# Patient Record
Sex: MALE | Race: WHITE | Employment: FULL TIME | URBAN - METROPOLITAN AREA
[De-identification: names, ages, dates, MRNs, and addresses within clinical notes are randomized per-mention and may not be internally consistent; named-entity substitution may affect disease eponyms.]

---

## 2018-09-27 ENCOUNTER — OFFICE VISIT (OUTPATIENT)
Dept: FAMILY MEDICINE CLINIC | Age: 38
End: 2018-09-27
Payer: COMMERCIAL

## 2018-09-27 VITALS
HEART RATE: 90 BPM | BODY MASS INDEX: 29.4 KG/M2 | WEIGHT: 210 LBS | OXYGEN SATURATION: 96 % | DIASTOLIC BLOOD PRESSURE: 85 MMHG | SYSTOLIC BLOOD PRESSURE: 131 MMHG | HEIGHT: 71 IN

## 2018-09-27 DIAGNOSIS — F41.9 ANXIETY: ICD-10-CM

## 2018-09-27 DIAGNOSIS — R06.83 SNORING: ICD-10-CM

## 2018-09-27 DIAGNOSIS — Z13.1 DIABETES MELLITUS SCREENING: ICD-10-CM

## 2018-09-27 DIAGNOSIS — Z00.00 HEALTH CARE MAINTENANCE: Primary | ICD-10-CM

## 2018-09-27 DIAGNOSIS — E66.3 OVER WEIGHT: ICD-10-CM

## 2018-09-27 PROCEDURE — 99385 PREV VISIT NEW AGE 18-39: CPT | Performed by: FAMILY MEDICINE

## 2018-09-27 ASSESSMENT — PATIENT HEALTH QUESTIONNAIRE - PHQ9
SUM OF ALL RESPONSES TO PHQ9 QUESTIONS 1 & 2: 0
SUM OF ALL RESPONSES TO PHQ QUESTIONS 1-9: 0
1. LITTLE INTEREST OR PLEASURE IN DOING THINGS: 0
SUM OF ALL RESPONSES TO PHQ QUESTIONS 1-9: 0
2. FEELING DOWN, DEPRESSED OR HOPELESS: 0

## 2018-09-27 NOTE — PROGRESS NOTES
2018    Samra Noyola (:  1980) is a 41 y.o. male, here for a preventive medicine evaluation. Some trouble falling asleep, has been drinking caffienated drinks late in the day  Diet:drinks pop, fred aid  Exercise: doesn't do as much as he likes to, at job on feet constantly moving,  at [employer]    Patient Active Problem List   Diagnosis    Influenza    Trichomoniasis    Anxiety    Over weight       Prior to Visit Medications    Not on File        No Known Allergies    Past Medical History:   Diagnosis Date    Anxiety        No past surgical history on file. Social History     Social History    Marital status: Single     Spouse name: N/A    Number of children: N/A    Years of education: N/A     Occupational History    Not on file. Social History Main Topics    Smoking status: Former Smoker     Packs/day: 1.00     Years: 8.00     Types: Cigarettes     Quit date: 10/13/2014    Smokeless tobacco: Never Used    Alcohol use No      Comment: rarely    Drug use: No    Sexual activity: Not on file     Other Topics Concern    Not on file     Social History Narrative    No narrative on file        Family History   Problem Relation Age of Onset    Cancer Mother     Cancer Maternal Grandmother     Diabetes Maternal Grandfather     Other Maternal Grandfather        ADVANCE DIRECTIVE: N, Not Received    Vitals:    18 1514 18 1520   BP: (!) 146/89 131/85   Pulse: 97 90   SpO2: 96%    Weight: 210 lb (95.3 kg)    Height: 5' 11\" (1.803 m)      Estimated body mass index is 29.29 kg/m² as calculated from the following:    Height as of this encounter: 5' 11\" (1.803 m). Weight as of this encounter: 210 lb (95.3 kg). Physical Exam   Constitutional: He is oriented to person, place, and time. He appears well-developed and well-nourished. HENT:   Head: Normocephalic and atraumatic. Eyes: Conjunctivae and EOM are normal.   Neck: Normal range of motion.  No

## 2018-11-16 ENCOUNTER — TELEPHONE (OUTPATIENT)
Dept: PULMONOLOGY | Age: 38
End: 2018-11-16

## 2019-04-28 ENCOUNTER — APPOINTMENT (OUTPATIENT)
Dept: GENERAL RADIOLOGY | Age: 39
End: 2019-04-28
Payer: COMMERCIAL

## 2019-04-28 ENCOUNTER — HOSPITAL ENCOUNTER (EMERGENCY)
Age: 39
Discharge: HOME | End: 2019-04-28
Payer: COMMERCIAL

## 2019-04-28 VITALS
BODY MASS INDEX: 29.4 KG/M2 | SYSTOLIC BLOOD PRESSURE: 118 MMHG | TEMPERATURE: 98 F | RESPIRATION RATE: 12 BRPM | DIASTOLIC BLOOD PRESSURE: 84 MMHG | HEIGHT: 71 IN | HEART RATE: 68 BPM | OXYGEN SATURATION: 96 % | WEIGHT: 210 LBS

## 2019-04-28 DIAGNOSIS — R04.2 HEMOPTYSIS: Primary | ICD-10-CM

## 2019-04-28 PROCEDURE — 71046 X-RAY EXAM CHEST 2 VIEWS: CPT

## 2019-04-28 PROCEDURE — 99284 EMERGENCY DEPT VISIT MOD MDM: CPT

## 2019-04-28 RX ORDER — OMEPRAZOLE 20 MG/1
20 CAPSULE, DELAYED RELEASE ORAL
Qty: 180 CAPSULE | Refills: 1 | Status: SHIPPED | OUTPATIENT
Start: 2019-04-28 | End: 2020-08-24

## 2019-04-28 ASSESSMENT — PAIN DESCRIPTION - PAIN TYPE: TYPE: ACUTE PAIN

## 2019-04-28 NOTE — ED PROVIDER NOTES
Emergency 1 [facility] [address] ED    Patient: Randi Kenny  [facility]:[id number]  : 1980  Date of Evaluation: 2019  ED BERTO Provider: YUSRA Cruz    Chief Complaint       Chief Complaint   Patient presents with    Hemoptysis     coughed up bright red blood at 830 today     Anton Wise is a 46 y.o. male with past medical history significant for anxiety now presents to the emergency department for evaluation of hemoptysis which he describes occurring prehospital times one. Patient states that he woke up and shortly after he woke up he had a odd taste in his mouth he stated that he began to cough when he coughed he noticed that he coughed up some blood and this concerned him and prompted his presentation emergency department today. He denies any chest pain any sore throat symptoms any dysphagia or any shortness of breath or any trouble tolerating his secretions he complains only of 6 out of 10 concern over this hemoptysis ×1 independent of any recent fever chills nausea vomiting or other change in his health without other radiation aggravating or relieving factors times one day    ROS:     Review of Systems one-day history of idiopathic hemoptysis ×1  At least 10 systemsreviewed and otherwise acutely negative except as in the [address] Past History     Past Medical History:   Diagnosis Date    Anxiety     Anxiety      History reviewed. No pertinent surgical history.   Social History     Socioeconomic History    Marital status: Single     Spouse name: None    Number of children: None    Years of education: None    Highest education level: None   Occupational History    None   Social Needs    Financial resource strain: None    Food insecurity:     Worry: None     Inability: None    Transportation needs:     Medical: None     Non-medical: None   Tobacco Use    Smoking status: Former Smoker     Packs/day: 1.00     Years: 8.00     Pack years: 8.00 sensorimotor intact ×4 extremities  Integument:  Skin is warm well perfused  Lymphatic:  No significant lymphadenopathy appreciated  Neurologic:  Alert and oriented ×3, cranial nerves II through XII grossly intact as tested, patient able to ambulate, no ataxia, cauda equina, saddle anesthesia or bowel or bladder incontinence  Psychiatric:  Mood, behavior, judgment all within normal limits      ED Course and MDM           In brief, Kaya Worthington is a 46 y.o. male who presented to the emergency department for evaluation of one-day history of idiopathic hemoptysis ×1     On clinical evaluation patient does not demonstrate any obvious sequelae of this there is no ongoing cough no ongoing evidence of blood intraoral cavity is clear patient tolerating his secretions well there's no concern for any active or ongoing process    My sense is that this patient probably noted some coughing following postnasal drip and some secretions from friable and dry mucous membranes of the nasal cavity and posterior oral pharynx and when he coughed and produce this sputum this concerned him due to its color    Other differential considerations could include reflux disease corina the patient will be offered a medication the outpatient setting to manage ongoing reflux should this be a consideration. He is also encouraged to follow up with his PCP for further evaluation treatment and possible referral to GI specialty for endoscopy    ED Medication Orders (From admission, onward)    None          Final Impression      1. Hemoptysis        DISPOSITION Decision To Discharge 04/28/2019 10:06:27 AM     Patient seen independently with an Emergency Medicine attending available for supervision.     (Please note that portions of this note may have been completed with a voice recognition program. Efforts were made to edit the dictations but occasionally words aremis-transcribed.)    Deangelo Tello, [address] [facility] [address] 51, [place]  04/28/19 1018

## 2019-04-28 NOTE — LETTER
[address]  [facility] [address]  Phone: [phone]             April 28, 2019    Patient: Forrest Diego   YOB: 1980   Date of Visit: 4/28/2019       To Whom It May Concern:    Morelia Quintana was seen and treated in our emergency department on 4/28/2019. He may return to Work/School on the following date 4/29/2019.       Sincerely,       Erin Church RN         Signature:__________________________________

## 2019-04-28 NOTE — LETTER
[facility] [place] ED  [facility] [address]  Phone: [phone]             April 28, 2019    Patient: Ara Garcia   YOB: 1980   Date of Visit: 4/28/2019       To Whom It May Concern:    Ju Sherman was seen and treated in our emergency department on 4/28/2019. He may return to Work/School on the following date 4/29/2019.       Sincerely,       Fuentes Sanchez RN         Signature:__________________________________

## 2019-05-10 ENCOUNTER — OFFICE VISIT (OUTPATIENT)
Dept: FAMILY MEDICINE CLINIC | Age: 39
End: 2019-05-10
Payer: COMMERCIAL

## 2019-05-10 VITALS
BODY MASS INDEX: 28.56 KG/M2 | OXYGEN SATURATION: 96 % | HEIGHT: 71 IN | WEIGHT: 204 LBS | RESPIRATION RATE: 16 BRPM | TEMPERATURE: 99.2 F | DIASTOLIC BLOOD PRESSURE: 80 MMHG | SYSTOLIC BLOOD PRESSURE: 124 MMHG | HEART RATE: 117 BPM

## 2019-05-10 DIAGNOSIS — J06.9 VIRAL URI: Primary | ICD-10-CM

## 2019-05-10 DIAGNOSIS — R52 GENERALIZED BODY ACHES: ICD-10-CM

## 2019-05-10 LAB
INFLUENZA A ANTIBODY: NORMAL
INFLUENZA B ANTIBODY: NORMAL

## 2019-05-10 PROCEDURE — 99213 OFFICE O/P EST LOW 20 MIN: CPT | Performed by: NURSE PRACTITIONER

## 2019-05-10 PROCEDURE — 87804 INFLUENZA ASSAY W/OPTIC: CPT | Performed by: NURSE PRACTITIONER

## 2019-05-10 RX ORDER — AMOXICILLIN AND CLAVULANATE POTASSIUM 875; 125 MG/1; MG/1
1 TABLET, FILM COATED ORAL 2 TIMES DAILY
Qty: 14 TABLET | Refills: 0 | Status: SHIPPED | OUTPATIENT
Start: 2019-05-10 | End: 2019-05-17

## 2019-05-10 ASSESSMENT — PATIENT HEALTH QUESTIONNAIRE - PHQ9
SUM OF ALL RESPONSES TO PHQ QUESTIONS 1-9: 0
1. LITTLE INTEREST OR PLEASURE IN DOING THINGS: 0
SUM OF ALL RESPONSES TO PHQ QUESTIONS 1-9: 0
SUM OF ALL RESPONSES TO PHQ9 QUESTIONS 1 & 2: 0
2. FEELING DOWN, DEPRESSED OR HOPELESS: 0

## 2019-05-10 ASSESSMENT — ENCOUNTER SYMPTOMS
COUGH: 1
SORE THROAT: 1
VOMITING: 1
SHORTNESS OF BREATH: 0
RHINORRHEA: 1
SINUS PRESSURE: 1
NAUSEA: 0
SINUS PAIN: 1
VOICE CHANGE: 1
WHEEZING: 0

## 2019-05-10 NOTE — PROGRESS NOTES
5/10/2019     Liz Allen (:  1980) is a 46 y.o. male, here for evaluation of the following medical concerns:    HPI  Patient presents today with complaints of sinus congestion, cough, body aches and fever. Temp here today was 99.2. He has not checked his temp at home. He has tried some sinus medication OTC that has not helped. He is coughing up some yellow mucous. He did have vomiting today. Review of Systems   Constitutional: Positive for chills, diaphoresis, fatigue and fever. HENT: Positive for congestion, rhinorrhea, sinus pressure, sinus pain, sore throat and voice change. Negative for ear discharge and ear pain. Respiratory: Positive for cough. Negative for shortness of breath and wheezing. Cardiovascular: Negative. Gastrointestinal: Positive for vomiting. Negative for nausea. Genitourinary: Negative. Musculoskeletal:        General body aches   Neurological: Negative for dizziness and headaches. Psychiatric/Behavioral: Negative. Prior to Visit Medications    Medication Sig Taking?  Authorizing Provider   amoxicillin-clavulanate (AUGMENTIN) 875-125 MG per tablet Take 1 tablet by mouth 2 times daily for 7 days Yes NADER Cabezas - CNP   omeprazole (PRILOSEC) 20 MG delayed release capsule Take 1 capsule by mouth 2 times daily (before meals) Yes YUSRA Velarde        Social History     Tobacco Use    Smoking status: Former Smoker     Packs/day: 1.00     Years: 8.00     Pack years: 8.00     Types: Cigarettes     Last attempt to quit: 10/13/2014     Years since quittin.5    Smokeless tobacco: Never Used   Substance Use Topics    Alcohol use: Yes     Comment: rarely        Vitals:    05/10/19 1450   BP: 124/80   Pulse: 117   Resp: 16   Temp: 99.2 °F (37.3 °C)   SpO2: 96%   Weight: 204 lb (92.5 kg)   Height: 5' 11\" (1.803 m)     Estimated body mass index is 28.45 kg/m² as calculated from the following:    Height as of this encounter: 5' 11\" (1.803 m). Weight as of this encounter: 204 lb (92.5 kg). Physical Exam   Constitutional: He is oriented to person, place, and time. He appears well-developed and well-nourished. HENT:   Head: Normocephalic. Right Ear: Hearing, tympanic membrane, external ear and ear canal normal.   Left Ear: Hearing, tympanic membrane, external ear and ear canal normal.   Nose: No mucosal edema. Right sinus exhibits frontal sinus tenderness. Right sinus exhibits no maxillary sinus tenderness. Left sinus exhibits frontal sinus tenderness. Left sinus exhibits no maxillary sinus tenderness. Mouth/Throat: Uvula is midline and mucous membranes are normal. Posterior oropharyngeal erythema present. No oropharyngeal exudate or posterior oropharyngeal edema. No tonsillar exudate. Cardiovascular: Normal rate and regular rhythm. Pulmonary/Chest: Effort normal and breath sounds normal.   Musculoskeletal: Normal range of motion. Neurological: He is alert and oriented to person, place, and time. Skin: Skin is warm and dry. Vitals reviewed. ASSESSMENT/PLAN:  1. Viral URI  likely viral. I advised him to take sudafed that is available OTC and if no improvement then he can take Augmentin. I gave him a printed script for Augmentin to fill if no improving    2. Generalized body aches  Rapid flu is negative. - RAPID INFLUENZA A/B ANTIGENS          An electronic signature was used to authenticate this note.     --NADER Granados - CNP on 5/10/2019 at 3:26 PM

## 2020-08-20 ENCOUNTER — HOSPITAL ENCOUNTER (EMERGENCY)
Age: 40
Discharge: HOME | End: 2020-08-20

## 2020-08-20 ENCOUNTER — NURSE TRIAGE (OUTPATIENT)
Dept: OTHER | Facility: CLINIC | Age: 40
End: 2020-08-20

## 2020-08-20 ENCOUNTER — APPOINTMENT (OUTPATIENT)
Dept: GENERAL RADIOLOGY | Age: 40
End: 2020-08-20

## 2020-08-20 VITALS
TEMPERATURE: 98 F | OXYGEN SATURATION: 98 % | DIASTOLIC BLOOD PRESSURE: 87 MMHG | HEART RATE: 93 BPM | HEIGHT: 71 IN | WEIGHT: 205 LBS | BODY MASS INDEX: 28.7 KG/M2 | RESPIRATION RATE: 14 BRPM | SYSTOLIC BLOOD PRESSURE: 131 MMHG

## 2020-08-20 LAB
A/G RATIO: 1.4 (ref 1.1–2.2)
ALBUMIN SERPL-MCNC: 4.6 G/DL (ref 3.4–5)
ALP BLD-CCNC: 93 U/L (ref 40–129)
ALT SERPL-CCNC: 33 U/L (ref 10–40)
ANION GAP SERPL CALCULATED.3IONS-SCNC: 9 MMOL/L (ref 3–16)
AST SERPL-CCNC: 21 U/L (ref 15–37)
BASOPHILS ABSOLUTE: 0.1 K/UL (ref 0–0.2)
BASOPHILS RELATIVE PERCENT: 0.8 %
BILIRUB SERPL-MCNC: 0.6 MG/DL (ref 0–1)
BUN BLDV-MCNC: 17 MG/DL (ref 7–20)
CALCIUM SERPL-MCNC: 9.4 MG/DL (ref 8.3–10.6)
CHLORIDE BLD-SCNC: 102 MMOL/L (ref 99–110)
CO2: 24 MMOL/L (ref 21–32)
CREAT SERPL-MCNC: 1 MG/DL (ref 0.9–1.3)
EOSINOPHILS ABSOLUTE: 0.6 K/UL (ref 0–0.6)
EOSINOPHILS RELATIVE PERCENT: 5.1 %
GFR AFRICAN AMERICAN: >60
GFR NON-AFRICAN AMERICAN: >60
GLOBULIN: 3.4 G/DL
GLUCOSE BLD-MCNC: 124 MG/DL (ref 70–99)
HCT VFR BLD CALC: 49.1 % (ref 40.5–52.5)
HEMOGLOBIN: 16.8 G/DL (ref 13.5–17.5)
LYMPHOCYTES ABSOLUTE: 1.6 K/UL (ref 1–5.1)
LYMPHOCYTES RELATIVE PERCENT: 12.7 %
MCH RBC QN AUTO: 33.7 PG (ref 26–34)
MCHC RBC AUTO-ENTMCNC: 34.2 G/DL (ref 31–36)
MCV RBC AUTO: 98.7 FL (ref 80–100)
MONOCYTES ABSOLUTE: 0.6 K/UL (ref 0–1.3)
MONOCYTES RELATIVE PERCENT: 4.9 %
NEUTROPHILS ABSOLUTE: 9.4 K/UL (ref 1.7–7.7)
NEUTROPHILS RELATIVE PERCENT: 76.5 %
PDW BLD-RTO: 12.7 % (ref 12.4–15.4)
PLATELET # BLD: 270 K/UL (ref 135–450)
PLATELET SLIDE REVIEW: ADEQUATE
PMV BLD AUTO: 7.6 FL (ref 5–10.5)
POTASSIUM REFLEX MAGNESIUM: 4.2 MMOL/L (ref 3.5–5.1)
RBC # BLD: 4.97 M/UL (ref 4.2–5.9)
SLIDE REVIEW: ABNORMAL
SODIUM BLD-SCNC: 135 MMOL/L (ref 136–145)
TOTAL PROTEIN: 8 G/DL (ref 6.4–8.2)
TROPONIN: <0.01 NG/ML
WBC # BLD: 12.2 K/UL (ref 4–11)

## 2020-08-20 PROCEDURE — 6370000000 HC RX 637 (ALT 250 FOR IP): Performed by: PHYSICIAN ASSISTANT

## 2020-08-20 PROCEDURE — 6360000002 HC RX W HCPCS: Performed by: PHYSICIAN ASSISTANT

## 2020-08-20 PROCEDURE — 71046 X-RAY EXAM CHEST 2 VIEWS: CPT

## 2020-08-20 PROCEDURE — 84484 ASSAY OF TROPONIN QUANT: CPT

## 2020-08-20 PROCEDURE — 73030 X-RAY EXAM OF SHOULDER: CPT

## 2020-08-20 PROCEDURE — 80053 COMPREHEN METABOLIC PANEL: CPT

## 2020-08-20 PROCEDURE — 85025 COMPLETE CBC W/AUTO DIFF WBC: CPT

## 2020-08-20 PROCEDURE — 93005 ELECTROCARDIOGRAM TRACING: CPT | Performed by: PHYSICIAN ASSISTANT

## 2020-08-20 PROCEDURE — 99284 EMERGENCY DEPT VISIT MOD MDM: CPT

## 2020-08-20 RX ORDER — DEXAMETHASONE 4 MG/1
6 TABLET ORAL ONCE
Status: COMPLETED | OUTPATIENT
Start: 2020-08-20 | End: 2020-08-20

## 2020-08-20 RX ORDER — NAPROXEN 500 MG/1
500 TABLET ORAL 2 TIMES DAILY
Qty: 20 TABLET | Refills: 0 | Status: SHIPPED | OUTPATIENT
Start: 2020-08-20 | End: 2021-01-25

## 2020-08-20 RX ORDER — NAPROXEN 500 MG/1
500 TABLET ORAL ONCE
Status: COMPLETED | OUTPATIENT
Start: 2020-08-20 | End: 2020-08-20

## 2020-08-20 RX ORDER — METHYLPREDNISOLONE 4 MG/1
TABLET ORAL
Qty: 1 KIT | Refills: 0 | Status: SHIPPED | OUTPATIENT
Start: 2020-08-20 | End: 2020-08-24

## 2020-08-20 RX ORDER — ASPIRIN 325 MG
325 TABLET ORAL ONCE
Status: COMPLETED | OUTPATIENT
Start: 2020-08-20 | End: 2020-08-20

## 2020-08-20 RX ADMIN — NAPROXEN 500 MG: 500 TABLET ORAL at 19:46

## 2020-08-20 RX ADMIN — DEXAMETHASONE 6 MG: 4 TABLET ORAL at 19:46

## 2020-08-20 RX ADMIN — ASPIRIN 325 MG: 325 TABLET, FILM COATED ORAL at 18:40

## 2020-08-20 ASSESSMENT — ENCOUNTER SYMPTOMS
SINUS PRESSURE: 0
SINUS PAIN: 0
EYE REDNESS: 0
RHINORRHEA: 0
SORE THROAT: 0
DIARRHEA: 0
CONSTIPATION: 0
ABDOMINAL PAIN: 0
NAUSEA: 0
COUGH: 0
EYE DISCHARGE: 0
SHORTNESS OF BREATH: 0
VOMITING: 0
CHEST TIGHTNESS: 0

## 2020-08-20 ASSESSMENT — PAIN SCALES - GENERAL
PAINLEVEL_OUTOF10: 3
PAINLEVEL_OUTOF10: 3

## 2020-08-20 ASSESSMENT — PAIN DESCRIPTION - FREQUENCY: FREQUENCY: CONTINUOUS

## 2020-08-20 ASSESSMENT — PAIN DESCRIPTION - DESCRIPTORS: DESCRIPTORS: CONSTANT

## 2020-08-20 ASSESSMENT — PAIN DESCRIPTION - LOCATION: LOCATION: SHOULDER;RIB CAGE

## 2020-08-20 ASSESSMENT — HEART SCORE: ECG: 0

## 2020-08-20 ASSESSMENT — PAIN DESCRIPTION - PAIN TYPE: TYPE: ACUTE PAIN

## 2020-08-20 ASSESSMENT — PAIN DESCRIPTION - ORIENTATION: ORIENTATION: RIGHT

## 2020-08-20 NOTE — ED NOTES
Pt notified employer requires drug screen. Outside agency will be here within the hour for urine sample. Brennan Duncan RN  08/20/20 [id number]

## 2020-08-20 NOTE — ED PROVIDER NOTES
I did not perform a face-to-face evaluation of the patient. I did interpret the patient's EKG as noted below: The Ekg interpreted by me shows  normal sinus rhythm with a rate of 86  Axis is   Normal  QTc is  normal  Intervals and Durations are unremarkable.       ST Segments: no acute change  No significant change from prior EKG dated 12/14/15            Cherry Barboza MD  08/20/20 2023

## 2020-08-20 NOTE — TELEPHONE ENCOUNTER
Reason for Disposition   SEVERE chest or rib pain (e.g., excruciating, unable to do any normal activities)    Answer Assessment - Initial Assessment Questions  1. MECHANISM: \"How did the injury happen? \"      Lifted and twisted while stocking items  2. ONSET: \"When did the injury happen? \" (e.g., minutes, hours, days ago)      2 days ago  3. LOCATION: \"Where on the chest is the injury located? \" \"Where does it hurt? \"      Right side and arm  4. CHEST OR RIB PAIN SEVERITY: \"How bad is the pain? \"  (e.g., Scale 1-10; mild, moderate, or severe)     - MILD (1-3): doesn't interfere with normal activities      - MODERATE (4-7): interferes with normal activities or awakens from sleep     - SEVERE (8-10): excruciating pain, unable to do any normal activities        moderate  5. BREATHING DIFFICULTY: Estle Basset you having any difficulty breathing? \" If so, ask \"How bad is it? \"  (e.g., none, mild, moderate, severe)   normal  6: OTHER SYMPTOMS (e.g., cough, fever, rash)       Cough, rash  7. PREGNANCY: \"Is there any chance you are pregnant? \" \"When was your last menstrual period? \"      n/a    Protocols used: CHEST INJURY - BENDING, LIFTING, OR TWISTING-ADULT-  Caller concerned with moderate pain at rest and sever pain with movement. Caller provided care advice and instructed to call back with worsening symptoms.

## 2020-08-20 NOTE — ED PROVIDER NOTES
[address] ED  EMERGENCY DEPARTMENT ENCOUNTER        Pt Name: Christian Sevilla  MRN: [id number]  [place] 1980  Date of evaluation: 8/20/2020  Provider: Estelle Short PA-C  PCP: Cari Haywood MD  ED Attending: No att. providers found      This patient was not seen and evaluated by the attending physician No att. providers found. I have independently evaluated this patient. CHIEF COMPLAINT       Chief Complaint   Patient presents with    Rib Pain     Right sided rib pain after lifting eggs @ work 8/18/19, painful to cough, also right sided shoulder pain       HISTORY OF PRESENT ILLNESS   (Location/Symptom, Timing/Onset, Context/Setting, Quality, Duration, Modifying Factors, Severity)  Note limiting factors. Christian Sevilla is a 45 y.o. male who presents to the ED today with right sided rib and shoulder pain. Onset was Tuesday 8/18/2020 while at work after lifting a box of eggs (~20lbs). He called his PCP about his symptoms and was advised to come to the ED for further workup. Patient states most of his pain is localized to his pec major muscle but sometimes radiates to his right shoulder. He states pain with deep inspiration and with AROM of right shoulder joint. Denies SOB, chest tightness, or paraesthesias in the distal extremity. Patient applied OTC topical analgesic cream yesterday and says that helped. Patient has no cardiac history. Nursing Notes were all reviewed and agreed with or any disagreements were addressed  in the HPI. REVIEW OF SYSTEMS  (2-9 systems for level 4, 10 or more for level 5)     Review of Systems   Constitutional: Negative for chills and fever. HENT: Negative. Negative for congestion, rhinorrhea, sinus pressure, sinus pain and sore throat. Eyes: Negative for discharge, redness and visual disturbance. Respiratory: Negative for cough, chest tightness and shortness of breath.     Cardiovascular: Negative for chest pain and palpitations. Gastrointestinal: Negative for abdominal pain, constipation, diarrhea, nausea and vomiting. Genitourinary: Negative for difficulty urinating, dysuria and frequency. Musculoskeletal: Positive for arthralgias and myalgias. Right pec major/minor and shoulder. No deformity. Skin: Negative. No erythema, bruising, or ecchymosis    Neurological: Negative. Negative for dizziness, weakness, numbness and headaches. Psychiatric/Behavioral: Negative. All other systems reviewed and are negative. Positivesand Pertinent negatives as per HPI. Except as noted above in the ROS, all other systems were reviewed and negative. PAST MEDICAL HISTORY     Past Medical History:   Diagnosis Date    Anxiety     Anxiety          SURGICAL HISTORY     History reviewed. No pertinent surgical history. CURRENT MEDICATIONS       Discharge Medication List as of 8/20/2020  7:35 PM      CONTINUE these medications which have NOT CHANGED    Details   omeprazole (PRILOSEC) 20 MG delayed release capsule Take 1 capsule by mouth 2 times daily (before meals), Disp-180 capsule, R-1Print               ALLERGIES     Patient has no known allergies.     FAMILY HISTORY       Family History   Problem Relation Age of Onset    Cancer Mother     Cancer Maternal Grandmother     Diabetes Maternal Grandfather     Other Maternal Grandfather          SOCIAL HISTORY       Social History     Socioeconomic History    Marital status: Single     Spouse name: None    Number of children: None    Years of education: None    Highest education level: None   Occupational History    None   Social Needs    Financial resource strain: None    Food insecurity     Worry: None     Inability: None    Transportation needs     Medical: None     Non-medical: None   Tobacco Use    Smoking status: Former Smoker     Packs/day: 1.00     Years: 8.00     Pack years: 8.00     Types: Cigarettes     Last attempt to quit: 10/13/2014 Years since quittin.8    Smokeless tobacco: Never Used   Substance and Sexual Activity    Alcohol use: Yes     Comment: rarely    Drug use: No    Sexual activity: None   Lifestyle    Physical activity     Days per week: None     Minutes per session: None    Stress: None   Relationships    Social connections     Talks on phone: None     Gets together: None     Attends Jehovah's witness service: None     Active member of club or organization: None     Attends meetings of clubs or organizations: None     Relationship status: None    Intimate partner violence     Fear of current or ex partner: None     Emotionally abused: None     Physically abused: None     Forced sexual activity: None   Other Topics Concern    None   Social History Narrative    None       SCREENINGS     NIH Score       [facility]ow Delong Coma Scale  Eye Opening: Spontaneous  Best Verbal Response: Oriented  Best Motor Response: Obeys commands  Kerrie Coma Scale Score: 15    [facility]     Heart Score  Heart Score for chest pain patients  History: Slightly Suspicious  ECG: Normal  Patient Age: < 45 years  *Risk factors for Atherosclerotic disease: Obesity, Cigarette smoking  Risk Factors: 1 or 2 risk factors  Troponin: < 1X normal limit  Heart Score Total: 1      PHYSICAL EXAM    (up to 7 for level 4, 8 ormore for level 5)     ED Triage Vitals [20 1758]   BP Temp Temp Source Pulse Resp SpO2 Height Weight   (!) 130/92 98.7 °F (37.1 °C) Oral 102 16 96 % 5' 11\" (1.803 m) 205 lb (93 kg)       Physical Exam  Vitals signs reviewed. Constitutional:       Appearance: Normal appearance. HENT:      Head: Normocephalic and atraumatic. Right Ear: Tympanic membrane normal.      Left Ear: Tympanic membrane normal.      Nose: Nose normal.      Mouth/Throat:      Mouth: Mucous membranes are moist.   Eyes:      Extraocular Movements: Extraocular movements intact.       Conjunctiva/sclera: Conjunctivae normal.      Pupils: Pupils are equal, round, and reactive to light. Neck:      Musculoskeletal: Neck supple. Cardiovascular:      Rate and Rhythm: Normal rate and regular rhythm. Pulses: Normal pulses. Heart sounds: Normal heart sounds. Pulmonary:      Effort: Pulmonary effort is normal.      Breath sounds: Normal breath sounds. Abdominal:      General: Bowel sounds are normal.   Musculoskeletal:         General: Tenderness present. No swelling or deformity. Comments: AROM/PROM of right shoulder joint due to pain. Tenderness over right pec major. Skin:     General: Skin is warm. Capillary Refill: Capillary refill takes less than 2 seconds. Findings: No bruising. Neurological:      General: No focal deficit present. Mental Status: He is alert. Psychiatric:         Mood and Affect: Mood normal.         DIAGNOSTIC RESULTS   LABS:    Labs Reviewed   CBC WITH AUTO DIFFERENTIAL - Abnormal; Notable for the following components:       Result Value    WBC 12.2 (*)     Neutrophils Absolute 9.4 (*)     All other components within normal limits    Narrative:     Performed at:  [place] [place] [address],  [place], [facility]   Phone [phone]   COMPREHENSIVE METABOLIC PANEL W/ REFLEX TO MG FOR LOW K - Abnormal; Notable for the following components:    Sodium 135 (*)     Glucose 124 (*)     All other components within normal limits    Narrative:     Performed at:  [facility],  [place], [facility]   Phone [phone]   TROPONIN    Narrative:     Performed at:  [facility]) - [place] [address],  [place], [facility]   Phone [phone]       All other labs were within normal range or notreturned as of this dictation. EKG:  All EKG's are interpreted by the Emergency Department Physician who either signs or Co-signs this chart in the absence of a cardiologist.  Please see their note for interpretation of EKG.      RADIOLOGY:         Interpretation per the Radiologist below, if available at the time of this note:    XR CHEST (2 VW)   Final Result   No evidence of acute cardiopulmonary disease. No evidence of rib fracture. XR SHOULDER RIGHT (MIN 2 VIEWS)   Final Result   Negative. No results found. PROCEDURES   Unless otherwise noted below, none     Procedures    CRITICAL CARE TIME   N/A    CONSULTS:  None      EMERGENCY DEPARTMENT COURSE and DIFFERENTIAL DIAGNOSIS/MDM:   Vitals:    Vitals:    08/20/20 1758 08/20/20 1951   BP: (!) 130/92 131/87   Pulse: 102 93   Resp: 16 14   Temp: 98.7 °F (37.1 °C) 98 °F (36.7 °C)   TempSrc: Oral Oral   SpO2: 96% 98%   Weight: 205 lb (93 kg)    Height: 5' 11\" (1.803 m)        Patient was given the following medications:  Medications   aspirin tablet 325 mg (325 mg Oral Given 8/20/20 1840)   naproxen (NAPROSYN) tablet 500 mg (500 mg Oral Given 8/20/20 1946)   dexamethasone (DECADRON) tablet 6 mg (6 mg Oral Given 8/20/20 1946)         Afebrile, stable, patient presents to the ED for evaluation. Seen in conjunction with attending ED provider who agrees with assessment and plan. Patients SPO2 is 98% on room air they are not hypoxic, nontoxic patient in no acute distress with injury to shoulder. Patient is provided with pain control. xrays without acute injury. All questions are answered. The patient tolerated their visit well. The patient and / or the family were informed of the results of any tests, a time was given to answer questions, a plan was proposed and they agreed Gillian Tan.     Results for orders placed or performed during the hospital encounter of 08/20/20   CBC Auto Differential   Result Value Ref Range    WBC 12.2 (H) 4.0 - 11.0 K/uL    RBC 4.97 4.20 - 5.90 M/uL    Hemoglobin 16.8 13.5 - 17.5 g/dL    Hematocrit 49.1 40.5 - 52.5 %    MCV 98.7 80.0 - 100.0 fL    MCH 33.7 26.0 - 34.0 pg    MCHC 34.2 31.0 - 36.0 g/dL    RDW 12.7 12.4 - 15.4 % Platelets 067 776 - 724 K/uL    MPV 7.6 5.0 - 10.5 fL    PLATELET SLIDE REVIEW Adequate     SLIDE REVIEW see below     Neutrophils % 76.5 %    Lymphocytes % 12.7 %    Monocytes % 4.9 %    Eosinophils % 5.1 %    Basophils % 0.8 %    Neutrophils Absolute 9.4 (H) 1.7 - 7.7 K/uL    Lymphocytes Absolute 1.6 1.0 - 5.1 K/uL    Monocytes Absolute 0.6 0.0 - 1.3 K/uL    Eosinophils Absolute 0.6 0.0 - 0.6 K/uL    Basophils Absolute 0.1 0.0 - 0.2 K/uL   Comprehensive Metabolic Panel w/ Reflex to MG   Result Value Ref Range    Sodium 135 (L) 136 - 145 mmol/L    Potassium reflex Magnesium 4.2 3.5 - 5.1 mmol/L    Chloride 102 99 - 110 mmol/L    CO2 24 21 - 32 mmol/L    Anion Gap 9 3 - 16    Glucose 124 (H) 70 - 99 mg/dL    BUN 17 7 - 20 mg/dL    CREATININE 1.0 0.9 - 1.3 mg/dL    GFR Non-African American >60 >60    GFR African American >60 >60    Calcium 9.4 8.3 - 10.6 mg/dL    Total Protein 8.0 6.4 - 8.2 g/dL    Alb 4.6 3.4 - 5.0 g/dL    Albumin/Globulin Ratio 1.4 1.1 - 2.2    Total Bilirubin 0.6 0.0 - 1.0 mg/dL    Alkaline Phosphatase 93 40 - 129 U/L    ALT 33 10 - 40 U/L    AST 21 15 - 37 U/L    Globulin 3.4 g/dL   Troponin   Result Value Ref Range    Troponin <0.01 <0.01 ng/mL   EKG 12 Lead   Result Value Ref Range    Ventricular Rate 86 BPM    Atrial Rate 86 BPM    P-R Interval 158 ms    QRS Duration 86 ms    Q-T Interval 354 ms    QTc Calculation (Bazett) 423 ms    P Axis 68 degrees    R Axis 37 degrees    T Axis 50 degrees    Diagnosis       Normal sinus rhythmNormal ECGWhen compared with ECG of 14-DEC-2015 16:43,No significant change was foundConfirmed by Audrey Robert MD, [address] ([facility]) on 8/21/2020 5:33:03 AM       I estimate there is LOW risk for COMPARTMENT SYNDROME, DEEP VENOUS THROMBOSIS, SEPTIC ARTHRITIS, TENDON OR NEUROVASCULAR INJURY, thus I consider the discharge disposition reasonable.  Bong Dobbs and I have discussed the diagnosis and risks, and we agree with discharging home to follow-up with their primary doctor or the referral orthopedist. We also discussed returning to the Emergency Department immediately if new or worsening symptoms occur. We have discussed the symptoms which are most concerning (e.g., changing or worsening pain, numbness, weakness) that necessitate immediate return. Final Impression    1. Injury of right shoulder, initial encounter    2. Chest wall pain        Blood pressure 131/87, pulse 93, temperature 98 °F (36.7 °C), temperature source Oral, resp. rate 14, height 5' 11\" (1.803 m), weight 205 lb (93 kg), SpO2 98 %. FINAL IMPRESSION      1. Injury of right shoulder, initial encounter    2.  Chest wall pain          DISPOSITION/PLAN   DISPOSITION Decision To Discharge 08/20/2020 07:34:02 PM      PATIENT REFERRED TO:  Efrain Shields MD  [address] [place] [id number]  [phone]          [facility] [address] [place] [address]  [phone]    for a recheck in 1-2  days    [facility]  [address]  [address] [place] [address] [address] [phone]      for a recheck in 1-2  days      DISCHARGE MEDICATIONS:  Discharge Medication List as of 8/20/2020  7:35 PM      START taking these medications    Details   naproxen (NAPROSYN) 500 MG tablet Take 1 tablet by mouth 2 times daily for 20 doses, Disp-20 tablet,R-0Print      methylPREDNISolone (MEDROL DOSEPACK) 4 MG tablet Take by mouth., Disp-1 kit,R-0Print             DISCONTINUED MEDICATIONS:  Discharge Medication List as of 8/20/2020  7:35 PM                 (Please note that portions of this note were completed with a voice recognition program.  Efforts were made to edit the dictations but occasionally words are mis-transcribed.)    Medina Moreira PA-C (electronically signed)        Medina Moreira PA-C  08/24/20 [id number]

## 2020-08-21 LAB
EKG ATRIAL RATE: 86 BPM
EKG DIAGNOSIS: NORMAL
EKG P AXIS: 68 DEGREES
EKG P-R INTERVAL: 158 MS
EKG Q-T INTERVAL: 354 MS
EKG QRS DURATION: 86 MS
EKG QTC CALCULATION (BAZETT): 423 MS
EKG R AXIS: 37 DEGREES
EKG T AXIS: 50 DEGREES
EKG VENTRICULAR RATE: 86 BPM

## 2020-08-21 PROCEDURE — 93010 ELECTROCARDIOGRAM REPORT: CPT | Performed by: INTERNAL MEDICINE

## 2020-08-24 ENCOUNTER — OFFICE VISIT (OUTPATIENT)
Dept: FAMILY MEDICINE CLINIC | Age: 40
End: 2020-08-24

## 2020-08-24 VITALS
OXYGEN SATURATION: 98 % | TEMPERATURE: 97.5 F | HEART RATE: 86 BPM | SYSTOLIC BLOOD PRESSURE: 138 MMHG | BODY MASS INDEX: 29.15 KG/M2 | WEIGHT: 209 LBS | DIASTOLIC BLOOD PRESSURE: 82 MMHG

## 2020-08-24 PROCEDURE — 99213 OFFICE O/P EST LOW 20 MIN: CPT | Performed by: NURSE PRACTITIONER

## 2020-08-24 RX ORDER — METHYLPREDNISOLONE 4 MG/1
TABLET ORAL
Qty: 21 TABLET | Refills: 0 | Status: SHIPPED | OUTPATIENT
Start: 2020-08-24 | End: 2021-01-25

## 2020-08-24 NOTE — PROGRESS NOTES
2020     Sarah  (:  1980) is a 45 y.o. male, here for evaluation of the following medical concerns:    HPI   Patient presents today with complaints of right shoulder pain, upper right back and under his arm pain. He has pain with movement and coughing and deep breathing. 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 He is not sure if this injury was from work or not. He does not have a specific episode that caused this pain. He was seen at the ER Thursday for pain, they did Xrays and cardiac work up and it was negative. They put him on light duty of not lifting more than 10 pounds and advised him to follow up with orthopedic. Naproxen and dexamethasone were given at the ER. He worked 2 16 hours days this weekend doing light duty and it caused him pain. If he stretches too high it hurts. He is supposed to be the 3rd shirt daily johan. Review of Systems   Musculoskeletal:        Right shoulder pain and right upper back and under right arm pain       Prior to Visit Medications    Medication Sig Taking? Authorizing Provider   Naproxen Sodium (ALEVE PO) Take by mouth Yes Historical Provider, MD   methylPREDNISolone (MEDROL, STEVEN,) 4 MG tablet Take by mouth.  Take 6 tabs on day 1, 5 tabs on day 2, 4 tabs on day 3, 3 tabs on day 4, 2 tabs on day 5, 1 tab on day 6 Yes NADER Chan - CNP   naproxen (NAPROSYN) 500 MG tablet Take 1 tablet by mouth 2 times daily for 20 doses  Patient not taking: Reported on 2020  Trinidad Del Valle PA-C        Social History     Tobacco Use    Smoking status: Former Smoker     Packs/day: 1.00     Years: 8.00     Pack years: 8.00     Types: Cigarettes     Last attempt to quit: 10/13/2014     Years since quittin.8    Smokeless tobacco: Never Used   Substance Use Topics    Alcohol use: Yes     Comment: rarely        Vitals:    20 0850 20 0925   BP: (!) 124/90 138/82   Pulse: 86    Temp: 97.5 °F (36.4 °C)    TempSrc: Temporal    SpO2: 98%    Weight: 209 lb (94.8 kg)      Estimated body mass index is 29.15 kg/m² as calculated from the following:    Height as of 8/20/20: 5' 11\" (1.803 m). Weight as of this encounter: 209 lb (94.8 kg). Physical Exam  Vitals signs reviewed. Cardiovascular:      Rate and Rhythm: Normal rate and regular rhythm. Heart sounds: Normal heart sounds. Pulmonary:      Breath sounds: Normal breath sounds. Musculoskeletal:      Right shoulder: He exhibits normal range of motion. Arms:    Neurological:      Mental Status: He is alert. ASSESSMENT/PLAN:  1. Acute pain of right shoulder  Given steroid pack, follow up with orthopedic. 2. Muscle strain  Off work for the next 2 nights and light duty after that until seen by orthopedic. An electronic signature was used to authenticate this note.     --NADER Lopez - CNP on 8/24/2020 at 9:26 AM

## 2020-08-24 NOTE — LETTER
[facility]  [place]  [address] [facility] [address]  Phone: [phone]  Fax: [phone]    NADER Ryan CNP        August 24, 2020     Patient: Jb Roa   YOB: 1980   Date of Visit: 8/24/2020       To Whom It May Concern: It is my medical opinion that Nestor Redmond may return to work on 8/26/2020 with the following restrictions: lifting/carrying not to exceed 10 lbs. .when he returns for a week until seen by Orthopedic. If you have any questions or concerns, please don't hesitate to call.     Sincerely,        NADER Ryan CNP

## 2020-08-25 ENCOUNTER — OFFICE VISIT (OUTPATIENT)
Dept: ORTHOPEDIC SURGERY | Age: 40
End: 2020-08-25

## 2020-08-25 VITALS — WEIGHT: 209 LBS | BODY MASS INDEX: 29.26 KG/M2 | HEIGHT: 71 IN

## 2020-08-25 PROCEDURE — 99243 OFF/OP CNSLTJ NEW/EST LOW 30: CPT | Performed by: ORTHOPAEDIC SURGERY

## 2020-08-25 NOTE — LETTER
[facility]  [address] [address]  Phone: [phone]  Fax: [phone]    Donald Olson MD        August 25, 2020     Patient: Zac Silver   YOB: 1980   Date of Visit: 8/25/2020       To Whom It May Concern: It is my medical opinion that Julián Child may return to work on 8/27/20, full duty. If you have any questions or concerns, please don't hesitate to call.     Sincerely,    Donald Olson MD

## 2020-08-25 NOTE — PROGRESS NOTES
Chief Complaint  New Patient (Right Shoulder: pain onset on 8/18/2020 He stocks shelves for work, He does dairy truck, the pain progressively gotten worst so he was seen in ER on 8/20/2020, no fx, most of the pain along the lateral asepct of the ribs and the pain shoots into the scapular with weighted lifting, has some numbness/tingling, hx of neck pain, no limited ROM )      History of Present Illness:  Braulio Pettit is a 45 y.o. y/o male who presents today for evaluation of his right shoulder. I am seeing him as a consultation at the request of Jayme Crain PA-C who referred him to our practice for further evaluation. On 8/18/2020 was lifting a crate of eggs when he noticed some pain in his shoulder in the area just below his axilla. He is had some pain with sneezing and coughing as well as certain motions of his shoulder. He went to the emergency department on 8/20/2020 was evaluated. He is given a prescription for Medrol Dosepak but has not yet started it. No prior history of major shoulder issues. No numbness or tingling. No significant neck pain. He has been off work, but feels like is getting better and ready to return. Occupation/activities: Dairy    Medical History  Past Medical History:   Diagnosis Date    Anxiety     Anxiety        No past surgical history on file.     Social History     Socioeconomic History    Marital status: Single     Spouse name: Not on file    Number of children: Not on file    Years of education: Not on file    Highest education level: Not on file   Occupational History    Not on file   Social Needs    Financial resource strain: Not on file    Food insecurity     Worry: Not on file     Inability: Not on file    Transportation needs     Medical: Not on file     Non-medical: Not on file   Tobacco Use    Smoking status: Former Smoker     Packs/day: 1.00     Years: 8.00     Pack years: 8.00     Types: Cigarettes     Last attempt to quit: 10/13/2014     Years since quittin.8    Smokeless tobacco: Never Used   Substance and Sexual Activity    Alcohol use: Yes     Comment: rarely    Drug use: No    Sexual activity: Not on file   Lifestyle    Physical activity     Days per week: Not on file     Minutes per session: Not on file    Stress: Not on file   Relationships    Social connections     Talks on phone: Not on file     Gets together: Not on file     Attends Muslim service: Not on file     Active member of club or organization: Not on file     Attends meetings of clubs or organizations: Not on file     Relationship status: Not on file    Intimate partner violence     Fear of current or ex partner: Not on file     Emotionally abused: Not on file     Physically abused: Not on file     Forced sexual activity: Not on file   Other Topics Concern    Not on file   Social History Narrative    Not on file       Family History   Problem Relation Age of Onset    Cancer Mother     Cancer Maternal Grandmother     Diabetes Maternal Grandfather     Other Maternal Grandfather         Review of Systems  Pertinent items are noted in HPI  Review of systems reviewed from Patient History Form dated on 2020 and available in the patient's chart under the Media tab. Vital Signs  There were no vitals filed for this visit. General Exam:   Constitutional: Patient is adequately groomed with no evidence of malnutrition  Mental Status: The patient is oriented to time, place and person. The patient's mood and affect are appropriate. Lymphatic: The lymphatic examination bilaterally reveals all areas to be without enlargement or induration. Neurological: The patient has good coordination. There is no weakness or sensory deficit. Gait: normal    Right shoulder Examination    Inspection: No atrophy or bruising    Palpation: Mild tenderness near the latissimus and under the axilla    Cervical Exam reproduces shoulder symptoms: Negative    Range of Motion:     Forward elevation: 180  External rotation at 0 degrees abduction: 45  Internal rotation to: L1  External rotation at 90 degrees abduction: 90  Internal rotation at 90 degrees abduction: 80    Sensation: In tact to light touch all nerve distributions     Strength:   5/5 supraspinatus  5/5 external rotation  5/5 internal rotation  5/5 anterior deltoid strength testing  5/5 abduction strength testing  Lift off: negative  Crepitus: negative    Special Tests:  O'shea's: negative  Speed's: negative  Yergason's: negative  Ugalde Impingement: negative  Neer Impingement: negative    Skin: There are no additional worrisome rashes, ulcerations or lesions. Circulation normal    Additional Examinations:  Left Upper Extremity: Examination of the left upper extremity does not show any tenderness, deformity or injury. Range of motion is unremarkable. There is no gross instability. There are no rashes, ulcerations or lesions. Strength and tone are normal.      Radiology:     THREE XRAY VIEWS OF THE RIGHT SHOULDER         8/20/2020 6:45 pm         COMPARISON:    None.         HISTORY:    ORDERING SYSTEM PROVIDED HISTORY: injury 8/18 lifting box of eggs    TECHNOLOGIST PROVIDED HISTORY:    Reason for exam:->injury 8/18 lifting box of eggs    Reason for Exam: Rib Pain (Right sided rib pain after lifting eggs @ work    8/18/19, painful to cough, also right sided shoulder pain)         FINDINGS:    The bony structures, soft tissues and joints appear within normal limits    throughout.  No fracture demonstrated, and no dislocation. Joint spacing    within normal limits. No significant degenerative change or other significant    finding.              Impression    Negative. Assessment:  78-year-old male with right shoulder strain of the latissimus and serratus    Office Procedures:  No orders of the defined types were placed in this encounter.       Plan:   -At this time I do recommend moving forward with his Medrol Dosepak, ice, activity modification, over-the-counter medications, gentle stretching  -He may return to work on 8/27/2020 full duty  -We will see him back in 1 month as needed and if there are issues in interim he will contact the office    MD Deandra Dial 59 partner of [facility])      Voice Recognition Dictation disclaimer: Please note that portions of this chart were generated using Dragon dictation software. Although every effort was made to ensure the accuracy of this automated transcription, some errors in transcription may have occurred.

## 2021-01-25 ENCOUNTER — NURSE TRIAGE (OUTPATIENT)
Dept: OTHER | Facility: CLINIC | Age: 41
End: 2021-01-25

## 2021-01-25 ENCOUNTER — VIRTUAL VISIT (OUTPATIENT)
Dept: FAMILY MEDICINE CLINIC | Age: 41
End: 2021-01-25
Payer: COMMERCIAL

## 2021-01-25 DIAGNOSIS — R68.89 FLU-LIKE SYMPTOMS: ICD-10-CM

## 2021-01-25 DIAGNOSIS — R05.9 COUGH: Primary | ICD-10-CM

## 2021-01-25 DIAGNOSIS — R68.83 CHILLS: ICD-10-CM

## 2021-01-25 DIAGNOSIS — R09.81 NASAL CONGESTION: ICD-10-CM

## 2021-01-25 DIAGNOSIS — R52 BODY ACHES: ICD-10-CM

## 2021-01-25 DIAGNOSIS — R09.89 CHEST CONGESTION: ICD-10-CM

## 2021-01-25 PROCEDURE — 99213 OFFICE O/P EST LOW 20 MIN: CPT | Performed by: NURSE PRACTITIONER

## 2021-01-25 PROCEDURE — 87804 INFLUENZA ASSAY W/OPTIC: CPT | Performed by: NURSE PRACTITIONER

## 2021-01-25 RX ORDER — BENZONATATE 100 MG/1
100 CAPSULE ORAL 3 TIMES DAILY PRN
Qty: 30 CAPSULE | Refills: 0 | Status: SHIPPED | OUTPATIENT
Start: 2021-01-25 | End: 2021-02-04

## 2021-01-25 RX ORDER — FLUTICASONE PROPIONATE 50 MCG
2 SPRAY, SUSPENSION (ML) NASAL DAILY
Qty: 1 BOTTLE | Refills: 1 | Status: SHIPPED | OUTPATIENT
Start: 2021-01-25 | End: 2022-06-07

## 2021-01-25 ASSESSMENT — ENCOUNTER SYMPTOMS
RHINORRHEA: 1
COUGH: 1

## 2021-01-25 ASSESSMENT — PATIENT HEALTH QUESTIONNAIRE - PHQ9
SUM OF ALL RESPONSES TO PHQ QUESTIONS 1-9: 0
2. FEELING DOWN, DEPRESSED OR HOPELESS: 0
DEPRESSION UNABLE TO ASSESS: FUNCTIONAL CAPACITY MOTIVATION LIMITS ACCURACY

## 2021-01-25 NOTE — PROGRESS NOTES
Patient: Rl Zavala is a 45 y.o. male who presents today with the following Chief Complaint(s):  Chief Complaint   Patient presents with    Congestion    Cough    Generalized Body Aches    Chills     Consented to a virtual visit today    HPI -this is a 52-year-old male patient with complaints of chest and nose congestion, productive cough, generalized body aches/chills. He denies no fever at this time. He also states that he is sneezing quite a lot. He has been using severe cold OTC for about a week now due to his symptoms but he is not getting any better. His work suggested that he be seen today. 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 Current Outpatient Medications   Medication Sig Dispense Refill    benzonatate (TESSALON) 100 MG capsule Take 1 capsule by mouth 3 times daily as needed for Cough 30 capsule 0    fluticasone (FLONASE) 50 MCG/ACT nasal spray 2 sprays by Each Nostril route daily 1 Bottle 1     No current facility-administered medications for this visit. Patient's past medical history, surgical history, family history, medications,  and allergies  were all reviewed and updated as appropriate today. Review of Systems   Constitutional: Positive for chills and fatigue. HENT: Positive for congestion (Chest and nasally), rhinorrhea and sneezing. Respiratory: Positive for cough (Productive does not know the color). All other systems reviewed and are negative. Physical Exam  Nursing note reviewed. Constitutional:       Appearance: He is ill-appearing. HENT:      Head: Normocephalic. Nose: Congestion present. Mouth/Throat:      Mouth: Mucous membranes are moist.   Eyes:      Conjunctiva/sclera: Conjunctivae normal.   Neck:      Musculoskeletal: Normal range of motion. Pulmonary:      Effort: Pulmonary effort is normal.      Comments: Cough sounded raspy  Musculoskeletal: Normal range of motion.    Skin: General: Skin is dry. Neurological:      Mental Status: He is alert and oriented to person, place, and time. Psychiatric:         Mood and Affect: Mood normal.         Behavior: Behavior normal.         Thought Content: Thought content normal.         Judgment: Judgment normal.       There were no vitals filed for this visit. Assessment:  Encounter Diagnoses   Name Primary?  Cough Yes    Flu-like symptoms     Body aches     Chills     Chest congestion     Nasal congestion        Controlled SubstancesMonitoring:  NA    Plan:  1. Cough  Problem  - COVID-19 Ambulatory; Future  Benzonatate 100 mg orally 3 times daily as needed #30    2. Flu-like symptoms  Problem  - POCT Influenza A/B  Treat symptoms  Push fluids, eat healthy increase vitamin C    3. Body aches  Problem  See above    4. Chills  Problem    5. Chest congestion  Problem    6. Nasal congestion  Problem  Flonase 50 MCG/ACT 2 sprays each nostril daily    Alfonzo Cruz is a 45 y.o. male being evaluated by a Virtual Visit (video visit) encounter to address concerns as mentioned above. A caregiver was present when appropriate. Due to this being a TeleHealth encounter (During [facility] public health emergency), evaluation of the following organ systems was limited: Vitals/Constitutional/EENT/Resp/CV/GI//MS/Neuro/Skin/Heme-Lymph-Imm. Pursuant to the emergency declaration under the [facility] [place] [address] authority and the [employer] Act, this Virtual Visit was conducted with patient's (and/or legal guardian's) consent, to reduce the patient's risk of exposure to COVID-19 and provide necessary medical care. The patient (and/or legal guardian) has also been advised to contact this office for worsening conditions or problems, and seek emergency medical treatment and/or call 911 if deemed necessary. Patient identification was verified at the start of the visit: Yes    Total time spent for this encounter: 15 min    Services were provided through a video synchronous discussion virtually to substitute for in-person clinic visit. Patient and provider were located at their individual homes. --NADER Hancock - CNP on 1/25/2021 at 12:50 PM    An electronic signature was used to authenticate this note. JASON Camp    Reviewed treatment plan with patient. Patient verbalized understanding to treatment plan and questions were answered. [address]  [place], [address]

## 2021-01-25 NOTE — TELEPHONE ENCOUNTER
Patient called pre-service center [facility] with red flag complaint. Brief description of triage: Left work last night not feeling well, coughing, sneezing, runny nose, body aches, chills, denies fever     Triage indicates for patient to Discuss with pcp and call back by nurse today, did recommend virtual visit to discuss symptoms or urgent care if needed     Care advice provided, patient verbalizes understanding; denies any other questions or concerns; instructed to call back for any new or worsening symptoms. Writer provided warm transfer to [facility] for appointment scheduling. Attention Provider: Thank you for allowing me to participate in the care of your patient. The patient was connected to triage in response to information provided to the [facility]. Please do not respond through this encounter as the response is not directed to a shared pool. Reason for Disposition   [1] Continuous (nonstop) coughing interferes with work or school AND [2] no improvement using cough treatment per protocol    Answer Assessment - Initial Assessment Questions  1. COVID-19 DIAGNOSIS: \"Who made your Coronavirus (COVID-19) diagnosis? \" \"Was it confirmed by a positive lab test?\" If not diagnosed by a HCP, ask \"Are there lots of cases (community spread) where you live? \" (See public health department website, if unsure)      Denies     2. COVID-19 EXPOSURE: \"Was there any known exposure to COVID before the symptoms began? \" CDC Definition of close contact: within 6 feet (2 meters) for a total of 15 minutes or more over a 24-hour period. Denies     3. ONSET: \"When did the COVID-19 symptoms start?\"       01/22/21    4. WORST SYMPTOM: \"What is your worst symptom? \" (e.g., cough, fever, shortness of breath, muscle aches)      Cough     5. COUGH: \"Do you have a cough? \" If so, ask: \"How bad is the cough? \"        Yes, productive     6. FEVER: \"Do you have a fever? \" If so, ask: \"What is your temperature, how was it measured, and when did it start? \"      Denies     7. RESPIRATORY STATUS: \"Describe your breathing? \" (e.g., shortness of breath, wheezing, unable to speak)       Wheezing     8. BETTER-SAME-WORSE: Solange Edmondsons you getting better, staying the same or getting worse compared to yesterday? \"  If getting worse, ask, \"In what way? \"      Same     9. HIGH RISK DISEASE: \"Do you have any chronic medical problems? \" (e.g., asthma, heart or lung disease, weak immune system, etc.)      Denies     10. PREGNANCY: \"Is there any chance you are pregnant? \" \"When was your last menstrual period? \"        N/a     11. OTHER SYMPTOMS: \"Do you have any other symptoms? \"  (e.g., chills, fatigue, headache, loss of smell or taste, muscle pain, sore throat)        Cough, chills, sneezing, runny nose, body aches,    Protocols used: CORONAVIRUS (COVID-19) DIAGNOSED OR SUSPECTED-ADULT-[place]

## 2021-01-26 ENCOUNTER — OFFICE VISIT (OUTPATIENT)
Dept: PRIMARY CARE CLINIC | Age: 41
End: 2021-01-26
Payer: COMMERCIAL

## 2021-01-26 DIAGNOSIS — Z11.59 SCREENING FOR VIRAL DISEASE: Primary | ICD-10-CM

## 2021-01-26 PROCEDURE — 99211 OFF/OP EST MAY X REQ PHY/QHP: CPT | Performed by: NURSE PRACTITIONER

## 2021-01-26 NOTE — PROGRESS NOTES
Hermila Childs received a viral test for COVID-19. They were educated on isolation and quarantine as appropriate. For any symptoms, they were directed to seek care from their PCP, given contact information to establish with a doctor, directed to an urgent care or the emergency room.

## 2021-01-26 NOTE — PATIENT INSTRUCTIONS

## 2021-01-27 LAB — SARS-COV-2, NAA: NOT DETECTED

## 2022-06-07 ENCOUNTER — OFFICE VISIT (OUTPATIENT)
Dept: FAMILY MEDICINE CLINIC | Age: 42
End: 2022-06-07
Payer: COMMERCIAL

## 2022-06-07 VITALS
OXYGEN SATURATION: 98 % | TEMPERATURE: 97.4 F | DIASTOLIC BLOOD PRESSURE: 78 MMHG | BODY MASS INDEX: 28.84 KG/M2 | HEART RATE: 87 BPM | SYSTOLIC BLOOD PRESSURE: 122 MMHG | HEIGHT: 71 IN | WEIGHT: 206 LBS

## 2022-06-07 DIAGNOSIS — H61.21 RIGHT EAR IMPACTED CERUMEN: ICD-10-CM

## 2022-06-07 DIAGNOSIS — Z00.00 WELL ADULT EXAM: Primary | ICD-10-CM

## 2022-06-07 DIAGNOSIS — R73.09 ELEVATED GLUCOSE: ICD-10-CM

## 2022-06-07 PROCEDURE — 99396 PREV VISIT EST AGE 40-64: CPT | Performed by: NURSE PRACTITIONER

## 2022-06-07 ASSESSMENT — PATIENT HEALTH QUESTIONNAIRE - PHQ9
4. FEELING TIRED OR HAVING LITTLE ENERGY: 1
SUM OF ALL RESPONSES TO PHQ QUESTIONS 1-9: 2
8. MOVING OR SPEAKING SO SLOWLY THAT OTHER PEOPLE COULD HAVE NOTICED. OR THE OPPOSITE, BEING SO FIGETY OR RESTLESS THAT YOU HAVE BEEN MOVING AROUND A LOT MORE THAN USUAL: 0
7. TROUBLE CONCENTRATING ON THINGS, SUCH AS READING THE NEWSPAPER OR WATCHING TELEVISION: 0
2. FEELING DOWN, DEPRESSED OR HOPELESS: 0
1. LITTLE INTEREST OR PLEASURE IN DOING THINGS: 0
SUM OF ALL RESPONSES TO PHQ QUESTIONS 1-9: 2
6. FEELING BAD ABOUT YOURSELF - OR THAT YOU ARE A FAILURE OR HAVE LET YOURSELF OR YOUR FAMILY DOWN: 0
3. TROUBLE FALLING OR STAYING ASLEEP: 1
10. IF YOU CHECKED OFF ANY PROBLEMS, HOW DIFFICULT HAVE THESE PROBLEMS MADE IT FOR YOU TO DO YOUR WORK, TAKE CARE OF THINGS AT HOME, OR GET ALONG WITH OTHER PEOPLE: 0
9. THOUGHTS THAT YOU WOULD BE BETTER OFF DEAD, OR OF HURTING YOURSELF: 0
5. POOR APPETITE OR OVEREATING: 0
SUM OF ALL RESPONSES TO PHQ9 QUESTIONS 1 & 2: 0

## 2022-06-07 ASSESSMENT — ENCOUNTER SYMPTOMS
GASTROINTESTINAL NEGATIVE: 1
SHORTNESS OF BREATH: 0
RESPIRATORY NEGATIVE: 1
COUGH: 0
WHEEZING: 0

## 2022-06-07 NOTE — PROGRESS NOTES
2022    Michael Pollock (:  1980) is a 40 y.o. male, here for a preventive medicine evaluation. Patient Active Problem List   Diagnosis    Influenza    Trichomoniasis    Anxiety    Over weight     HPI  Patient is here to establish care and for annual physical. He is feeling well overall and he does not have any chronic conditions. He has a history of anxiety- it is currently well controlled without medication. He does not take any medications regularly. Diet is improving, he has been making [employer] meals. He has lost about 10 lbs. He does a lot of lifting, walking, and yard work for exercise. Right ear fullness/feels muffled for the past month. Mild decreased hearing on the right side until it \"pops. \" He notices it more when he lays down. Bilateral tinnitus. Denies pain fevers, cough, congestion, headaches. He is not fasting and he wants to come back for labs. Review of Systems   Constitutional: Negative. Negative for fatigue. HENT: Positive for hearing loss and tinnitus. R hearing loss, bilateral tinnitus   Respiratory: Negative. Negative for cough, shortness of breath and wheezing. Cardiovascular: Negative. Negative for chest pain. Gastrointestinal: Negative. Musculoskeletal: Negative. Skin: Negative. Neurological: Negative. Negative for dizziness and light-headedness. Psychiatric/Behavioral: Negative. Prior to Visit Medications    Not on File        No Known Allergies    Past Medical History:   Diagnosis Date    Anxiety     Anxiety        History reviewed. No pertinent surgical history.     Social History     Socioeconomic History    Marital status: Single     Spouse name: Not on file    Number of children: Not on file    Years of education: Not on file    Highest education level: Not on file   Occupational History    Not on file   Tobacco Use    Smoking status: Former Smoker     Packs/day: 1.00     Years: 8.00     Pack years: 8.00     Types: Cigarettes     Quit date: 10/13/2014     Years since quittin.6    Smokeless tobacco: Never Used   Substance and Sexual Activity    Alcohol use: Yes     Comment: rarely    Drug use: No    Sexual activity: Not on file   Other Topics Concern    Not on file   Social History Narrative    Not on file     Social Determinants of Health     Financial Resource Strain:     Difficulty of Paying Living Expenses: Not on file   Food Insecurity:     Worried About Running Out of Food in the Last Year: Not on file    Raysa of Food in the Last Year: Not on file   Transportation Needs:     Lack of Transportation (Medical): Not on file    Lack of Transportation (Non-Medical):  Not on file   Physical Activity:     Days of Exercise per Week: Not on file    Minutes of Exercise per Session: Not on file   Stress:     Feeling of Stress : Not on file   Social Connections:     Frequency of Communication with Friends and Family: Not on file    Frequency of Social Gatherings with Friends and Family: Not on file    Attends Temple Services: Not on file    Active Member of [address] [place] [address] or Organizations: Not on file    Attends Club or Organization Meetings: Not on file    Marital Status: Not on file   Intimate Partner Violence:     Fear of Current or Ex-Partner: Not on file    Emotionally Abused: Not on file    Physically Abused: Not on file    Sexually Abused: Not on file   Housing Stability:     Unable to Pay for Housing in the Last Year: Not on file    Number of [place] in the Last Year: Not on file    Unstable Housing in the Last Year: Not on file        Family History   Problem Relation Age of Onset    Cancer Mother     Cancer Maternal Grandmother     Diabetes Maternal Grandfather     Other Maternal Grandfather        ADVANCE DIRECTIVE: N, <no information>    Vitals:    22 1526   BP: 122/78   Pulse: 87   Temp: 97.4 °F (36.3 °C)   SpO2: 98%   Weight: 206 lb (93.4 kg)   Height: 5' 11\" (1.803 m)     Estimated body mass index is 28.73 kg/m² as calculated from the following:    Height as of this encounter: 5' 11\" (1.803 m). Weight as of this encounter: 206 lb (93.4 kg). Physical Exam  Constitutional:       Appearance: Normal appearance. HENT:      Head: Normocephalic and atraumatic. Right Ear: Tympanic membrane, ear canal and external ear normal. There is impacted cerumen. Left Ear: Tympanic membrane, ear canal and external ear normal.      Ears:      Comments: TM visualized after irrigation     Mouth/Throat:      Mouth: Mucous membranes are moist.      Pharynx: No posterior oropharyngeal erythema. Cardiovascular:      Rate and Rhythm: Normal rate and regular rhythm. Heart sounds: Normal heart sounds. No murmur heard. Pulmonary:      Effort: Pulmonary effort is normal.      Breath sounds: Normal breath sounds. No wheezing. Abdominal:      General: Abdomen is flat. Bowel sounds are normal.      Palpations: Abdomen is soft. There is no mass. Tenderness: There is no abdominal tenderness. Musculoskeletal:         General: Normal range of motion. Skin:     General: Skin is warm and dry. Neurological:      General: No focal deficit present. Mental Status: He is alert and oriented to person, place, and time. No flowsheet data found.     Lab Results   Component Value Date    GLUCOSE 124 08/20/2020       The ASCVD Risk score ([place] Macario., et al., 2013) failed to calculate for the following reasons:    Cannot find a previous HDL lab    Cannot find a previous total cholesterol lab    Immunization History   Administered Date(s) Administered    COVID-19, Pfizer Purple top, DILUTE for use, 12+ yrs, 30mcg/0.3mL dose 04/05/2021, 05/27/2021    Tdap (Boostrix, Adacel) 06/11/2014       Health Maintenance   Topic Date Due    Varicella vaccine (1 of 2 - 2-dose childhood series) Never done    HIV screen  Never done    Hepatitis C screen  Never done    Diabetes screen  Never done    Lipids  Never done    COVID-19 Vaccine (3 - Booster for benchee series) 10/27/2021    Flu vaccine (Season Ended) 09/01/2022    Depression Screen  06/07/2023    DTaP/Tdap/Td vaccine (2 - Td or Tdap) 06/11/2024    Hepatitis A vaccine  Aged Out    Hepatitis B vaccine  Aged Out    Hib vaccine  Aged Out    Meningococcal (ACWY) vaccine  Aged Out    Pneumococcal 0-64 years Vaccine  Aged Out       Assessment & Plan   Well adult exam  -Physical in office without concerns. Will check labs and follow up as needed. -     Comprehensive Metabolic Panel; Future  -     LIPID PANEL; Future  Elevated glucose  -     Hemoglobin A1C; Future  Right ear impacted cerumen  -Ear irrigation (right) in office, wax removed. Patient reported improvement in hearing/fullness. -     Ear wax removal    No follow-ups on file.          --Dariusz Landing

## 2022-06-07 NOTE — PROGRESS NOTES
Patient ID: Claudette Hurtado is a 40 y.o. male who presents today for a Physical Exam.      HPI  Patient presents today for a wellness check up. He has a history of anxiety but states it is stable with out medications. He has had difficulty with hearing out of his right ear and feeling some pressure. Past Medical History:   Diagnosis Date    Anxiety     Anxiety        History reviewed. No pertinent surgical history. Family History   Problem Relation Age of Onset    Cancer Mother     Cancer Maternal Grandmother     Diabetes Maternal Grandfather     Other Maternal Grandfather           Social History     Socioeconomic History    Marital status: Single     Spouse name: None    Number of children: None    Years of education: None    Highest education level: None   Occupational History    None   Tobacco Use    Smoking status: Former Smoker     Packs/day: 1.00     Years: 8.00     Pack years: 8.00     Types: Cigarettes     Quit date: 10/13/2014     Years since quittin.6    Smokeless tobacco: Never Used   Substance and Sexual Activity    Alcohol use: Yes     Comment: rarely    Drug use: No    Sexual activity: None   Other Topics Concern    None   Social History Narrative    None     Social Determinants of Health     Financial Resource Strain:     Difficulty of Paying Living Expenses: Not on file   Food Insecurity:     Worried About Running Out of Food in the Last Year: Not on file    Raysa of Food in the Last Year: Not on file   Transportation Needs:     Lack of Transportation (Medical): Not on file    Lack of Transportation (Non-Medical):  Not on file   Physical Activity:     Days of Exercise per Week: Not on file    Minutes of Exercise per Session: Not on file   Stress:     Feeling of Stress : Not on file   Social Connections:     Frequency of Communication with Friends and Family: Not on file    Frequency of Social Gatherings with Friends and Family: Not on file    Attends Jew Services: Not on file    Active Member of Clubs or Organizations: Not on file    Attends Club or Organization Meetings: Not on file    Marital Status: Not on file   Intimate Partner Violence:     Fear of Current or Ex-Partner: Not on file    Emotionally Abused: Not on file    Physically Abused: Not on file    Sexually Abused: Not on file   Housing Stability:     Unable to Pay for Housing in the Last Year: Not on file    Number of [place] in the Last Year: Not on file    Unstable Housing in the Last Year: Not on file       No Known Allergies    No current outpatient medications on file. No current facility-administered medications for this visit. The patient's past medical history, past surgical history, family history, medications, and allergies were all reviewed and updated at appropriate today. Review of Systems   Constitutional: Negative. HENT: Positive for hearing loss. Respiratory: Negative. Cardiovascular: Negative. Gastrointestinal: Negative. Musculoskeletal: Negative. Skin: Negative. Neurological: Negative for dizziness and headaches. Psychiatric/Behavioral: The patient is nervous/anxious. Physical Exam  Vitals reviewed. HENT:      Right Ear: There is impacted cerumen. Left Ear: Hearing, tympanic membrane, ear canal and external ear normal.      Ears:      Comments: Right ear- impacted cerumen  Procedure: right ear irrigated with warm water/peroxide solution, curette was used to help remove the ear wax. Patient tolerated well. TM normal.   Cardiovascular:      Rate and Rhythm: Normal rate and regular rhythm. Heart sounds: Normal heart sounds. Pulmonary:      Effort: Pulmonary effort is normal.      Breath sounds: Normal breath sounds. Skin:     General: Skin is warm and dry. Neurological:      Mental Status: He is alert and oriented to person, place, and time. Assessment:  Encounter Diagnoses   Name Primary?     Well adult exam Yes    Elevated glucose     Right ear impacted cerumen        Plan:  1. Well adult exam  Will check blood work. - Comprehensive Metabolic Panel; Future  - LIPID PANEL; Future    2. Elevated glucose  Elevated glucose in the past,will check A1C.   - Hemoglobin A1C; Future    3. Right ear impacted cerumen  See procedure.    - Ear wax removal